# Patient Record
Sex: MALE | Race: WHITE | Employment: FULL TIME | URBAN - METROPOLITAN AREA
[De-identification: names, ages, dates, MRNs, and addresses within clinical notes are randomized per-mention and may not be internally consistent; named-entity substitution may affect disease eponyms.]

---

## 2020-01-23 RX ORDER — OMEPRAZOLE 20 MG/1
20 CAPSULE, DELAYED RELEASE ORAL DAILY
COMMUNITY

## 2020-01-23 RX ORDER — BUSPIRONE HYDROCHLORIDE 10 MG/1
10 TABLET ORAL 3 TIMES DAILY
COMMUNITY

## 2020-01-23 NOTE — PROGRESS NOTES
[address] [facility] time____0630________        Surgery time___0730_________    Take the following medications with a sip of water:    Do not eat or drink anything after 12:00 midnight prior to your surgery. EXCEPT PREP  This includes water chewing gum, mints and ice chips. You may brush your teeth and gargle the morning of your surgery, but do not swallow the water     You may be asked to stop blood thinners such as Coumadin, Plavix, Fragmin, Lovenox, etc., or any anti-inflammatories such as:  Aspirin, Ibuprofen, Advil, Naproxen prior to your surgery. We also ask that you stop any OTC medications such as fish oil, vitamin E, glucosamine, garlic, Multivitamins, COQ 10, etc.    We ask that you do not smoke 24 hours prior to surgery  We ask that you do not  drink any alcoholic beverages 24 hours prior to surgery     You must make arrangements for a responsible adult to take you home after your surgery. For your safety you will not be allowed to leave alone or drive yourself home. Your surgery will be cancelled if you do not have a ride home. Also for your safety, it is strongly suggested that someone stay with you the first 24 hours after your surgery. A parent or legal guardian must accompany a child scheduled for surgery and plan to stay at the hospital until the child is discharged. Please do not bring other children with you. For your comfort, please wear simple loose fitting clothing to the hospital.  Please do not bring valuables. Do not wear any make-up or nail polish on your fingers or toes      For your safety, please do not wear any jewelry or body piercing's on the day of surgery. All jewelry must be removed. If you have dentures, they will be removed before going to operating room. For your convenience, we will provide you with a container.     If you wear contact lenses or glasses, they will be removed, please bring a case for them.     If you have a living will and a durable power of  for healthcare, please bring in a copy. As part of our patient safety program to minimize surgical site infections, we ask you to do the following:    · Please notify your surgeon if you develop any illness between         now and the  day of your surgery. · This includes a cough, cold, fever, sore throat, nausea,         or vomiting, and diarrhea, etc.  ·  Please notify your surgeon if you experience dizziness, shortness         of breath or blurred vision between now and the time of your surgery. You may shower the night before surgery or the morning of   your surgery with an antibacterial soap. You will need to bring a photo ID and insurance card    [facility] has an onsite pharmacy, would you like to utilize our pharmacy     If you will be staying overnight and use a C-pap machine, please bring   your C-pap to hospital     Our goal is to provide you with excellent care, therefore, visitors will be limited to two(2) in the room at a time so that we may focus on providing this care for you. Please contact pre-admission testing if you have any further questions. [facility] phone number:  [phone]  Please note these are generalized instructions for all surgical cases, you may be provided with more specific instructions according to your surgery.

## 2020-01-31 ENCOUNTER — ANESTHESIA EVENT (OUTPATIENT)
Dept: ENDOSCOPY | Age: 55
End: 2020-01-31
Payer: COMMERCIAL

## 2020-02-03 ENCOUNTER — HOSPITAL ENCOUNTER (OUTPATIENT)
Age: 55
Setting detail: OUTPATIENT SURGERY
Discharge: HOME | End: 2020-02-03
Attending: INTERNAL MEDICINE | Admitting: INTERNAL MEDICINE
Payer: COMMERCIAL

## 2020-02-03 ENCOUNTER — ANESTHESIA (OUTPATIENT)
Dept: ENDOSCOPY | Age: 55
End: 2020-02-03
Payer: COMMERCIAL

## 2020-02-03 VITALS
SYSTOLIC BLOOD PRESSURE: 103 MMHG | DIASTOLIC BLOOD PRESSURE: 72 MMHG | OXYGEN SATURATION: 98 % | RESPIRATION RATE: 16 BRPM

## 2020-02-03 VITALS
OXYGEN SATURATION: 99 % | WEIGHT: 175 LBS | BODY MASS INDEX: 23.19 KG/M2 | HEIGHT: 73 IN | TEMPERATURE: 97.3 F | HEART RATE: 76 BPM | SYSTOLIC BLOOD PRESSURE: 117 MMHG | RESPIRATION RATE: 14 BRPM | DIASTOLIC BLOOD PRESSURE: 84 MMHG

## 2020-02-03 PROCEDURE — 7100000010 HC PHASE II RECOVERY - FIRST 15 MIN: Performed by: INTERNAL MEDICINE

## 2020-02-03 PROCEDURE — 88305 TISSUE EXAM BY PATHOLOGIST: CPT

## 2020-02-03 PROCEDURE — 7100000011 HC PHASE II RECOVERY - ADDTL 15 MIN: Performed by: INTERNAL MEDICINE

## 2020-02-03 PROCEDURE — 3700000000 HC ANESTHESIA ATTENDED CARE: Performed by: INTERNAL MEDICINE

## 2020-02-03 PROCEDURE — 2500000003 HC RX 250 WO HCPCS: Performed by: NURSE ANESTHETIST, CERTIFIED REGISTERED

## 2020-02-03 PROCEDURE — 3609010600 HC COLONOSCOPY POLYPECTOMY SNARE/COLD BIOPSY: Performed by: INTERNAL MEDICINE

## 2020-02-03 PROCEDURE — 2709999900 HC NON-CHARGEABLE SUPPLY: Performed by: INTERNAL MEDICINE

## 2020-02-03 PROCEDURE — 6360000002 HC RX W HCPCS: Performed by: NURSE ANESTHETIST, CERTIFIED REGISTERED

## 2020-02-03 PROCEDURE — 3700000001 HC ADD 15 MINUTES (ANESTHESIA): Performed by: INTERNAL MEDICINE

## 2020-02-03 PROCEDURE — 2580000003 HC RX 258: Performed by: ANESTHESIOLOGY

## 2020-02-03 RX ORDER — SODIUM CHLORIDE 9 MG/ML
INJECTION, SOLUTION INTRAVENOUS CONTINUOUS
Status: DISCONTINUED | OUTPATIENT
Start: 2020-02-03 | End: 2020-02-03 | Stop reason: HOSPADM

## 2020-02-03 RX ORDER — LIDOCAINE HYDROCHLORIDE 20 MG/ML
INJECTION, SOLUTION EPIDURAL; INFILTRATION; INTRACAUDAL; PERINEURAL PRN
Status: DISCONTINUED | OUTPATIENT
Start: 2020-02-03 | End: 2020-02-03 | Stop reason: SDUPTHER

## 2020-02-03 RX ORDER — PROPOFOL 10 MG/ML
INJECTION, EMULSION INTRAVENOUS PRN
Status: DISCONTINUED | OUTPATIENT
Start: 2020-02-03 | End: 2020-02-03 | Stop reason: SDUPTHER

## 2020-02-03 RX ORDER — SODIUM CHLORIDE 0.9 % (FLUSH) 0.9 %
10 SYRINGE (ML) INJECTION EVERY 12 HOURS SCHEDULED
Status: DISCONTINUED | OUTPATIENT
Start: 2020-02-03 | End: 2020-02-03 | Stop reason: HOSPADM

## 2020-02-03 RX ORDER — SODIUM CHLORIDE 0.9 % (FLUSH) 0.9 %
10 SYRINGE (ML) INJECTION PRN
Status: DISCONTINUED | OUTPATIENT
Start: 2020-02-03 | End: 2020-02-03 | Stop reason: HOSPADM

## 2020-02-03 RX ADMIN — PROPOFOL 15 MG: 10 INJECTION, EMULSION INTRAVENOUS at 07:50

## 2020-02-03 RX ADMIN — LIDOCAINE HYDROCHLORIDE 40 MG: 20 INJECTION, SOLUTION EPIDURAL; INFILTRATION; INTRACAUDAL; PERINEURAL at 07:29

## 2020-02-03 RX ADMIN — PROPOFOL 15 MG: 10 INJECTION, EMULSION INTRAVENOUS at 07:55

## 2020-02-03 RX ADMIN — PROPOFOL 15 MG: 10 INJECTION, EMULSION INTRAVENOUS at 07:43

## 2020-02-03 RX ADMIN — PROPOFOL 15 MG: 10 INJECTION, EMULSION INTRAVENOUS at 07:46

## 2020-02-03 RX ADMIN — LIDOCAINE HYDROCHLORIDE 10 MG: 20 INJECTION, SOLUTION EPIDURAL; INFILTRATION; INTRACAUDAL; PERINEURAL at 07:36

## 2020-02-03 RX ADMIN — PROPOFOL 15 MG: 10 INJECTION, EMULSION INTRAVENOUS at 07:53

## 2020-02-03 RX ADMIN — PROPOFOL 20 MG: 10 INJECTION, EMULSION INTRAVENOUS at 07:38

## 2020-02-03 RX ADMIN — LIDOCAINE HYDROCHLORIDE 10 MG: 20 INJECTION, SOLUTION EPIDURAL; INFILTRATION; INTRACAUDAL; PERINEURAL at 07:38

## 2020-02-03 RX ADMIN — LIDOCAINE HYDROCHLORIDE 20 MG: 20 INJECTION, SOLUTION EPIDURAL; INFILTRATION; INTRACAUDAL; PERINEURAL at 07:34

## 2020-02-03 RX ADMIN — LIDOCAINE HYDROCHLORIDE 20 MG: 20 INJECTION, SOLUTION EPIDURAL; INFILTRATION; INTRACAUDAL; PERINEURAL at 07:32

## 2020-02-03 RX ADMIN — PROPOFOL 40 MG: 10 INJECTION, EMULSION INTRAVENOUS at 07:32

## 2020-02-03 RX ADMIN — PROPOFOL 20 MG: 10 INJECTION, EMULSION INTRAVENOUS at 07:40

## 2020-02-03 RX ADMIN — PROPOFOL 80 MG: 10 INJECTION, EMULSION INTRAVENOUS at 07:29

## 2020-02-03 RX ADMIN — PROPOFOL 40 MG: 10 INJECTION, EMULSION INTRAVENOUS at 07:34

## 2020-02-03 RX ADMIN — SODIUM CHLORIDE: 0.9 INJECTION, SOLUTION INTRAVENOUS at 07:04

## 2020-02-03 RX ADMIN — PROPOFOL 20 MG: 10 INJECTION, EMULSION INTRAVENOUS at 07:36

## 2020-02-03 ASSESSMENT — PAIN SCALES - GENERAL
PAINLEVEL_OUTOF10: 0

## 2020-02-03 ASSESSMENT — LIFESTYLE VARIABLES: SMOKING_STATUS: 1

## 2020-02-03 ASSESSMENT — PAIN - FUNCTIONAL ASSESSMENT: PAIN_FUNCTIONAL_ASSESSMENT: 0-10

## 2020-02-03 NOTE — H&P
[facility]   Pre-operative History and Physical    Patient: Vaishali Bañuelos  : 1965  Acct#: [de-identified]    History Obtained From: electronic medical record    HISTORY OF PRESENT ILLNESS  Procedure:Colonoscopy  Indications:colon polyps  Past Medical History:        Diagnosis Date    Anxiety     GERD (gastroesophageal reflux disease)      Past Surgical History:        Procedure Laterality Date    COLONOSCOPY      HERNIA REPAIR      X 2     Medications prior to admission:   Prior to Admission medications    Medication Sig Start Date End Date Taking? Authorizing Provider   omeprazole (PRILOSEC) 20 MG delayed release capsule Take 20 mg by mouth daily   Yes Historical Provider, MD   busPIRone (BUSPAR) 10 MG tablet Take 10 mg by mouth 3 times daily   Yes Historical Provider, MD     Allergies:   Patient has no known allergies.     Social History     Socioeconomic History    Marital status:      Spouse name: Not on file    Number of children: Not on file    Years of education: Not on file    Highest education level: Not on file   Occupational History    Not on file   Social Needs    Financial resource strain: Not on file    Food insecurity:     Worry: Not on file     Inability: Not on file    Transportation needs:     Medical: Not on file     Non-medical: Not on file   Tobacco Use    Smoking status: Current Some Day Smoker     Types: Cigars    Smokeless tobacco: Never Used   Substance and Sexual Activity    Alcohol use: Not Currently    Drug use: Yes     Types: Marijuana     Comment: daily    Sexual activity: Not on file   Lifestyle    Physical activity:     Days per week: Not on file     Minutes per session: Not on file    Stress: Not on file   Relationships    Social connections:     Talks on phone: Not on file     Gets together: Not on file     Attends [employer] service: Not on file     Active member of club or organization: Not on file     Attends meetings of clubs or organizations: Not on file     Relationship status: Not on file    Intimate partner violence:     Fear of current or ex partner: Not on file     Emotionally abused: Not on file     Physically abused: Not on file     Forced sexual activity: Not on file   Other Topics Concern    Not on file   Social History Narrative    Not on file     Family History   Problem Relation Age of Onset    Cancer Mother     Cancer Father          PHYSICAL EXAM:      Ht 6' 1\" (1.854 m)   Wt 175 lb (79.4 kg)   BMI 23.09 kg/m²  I        Heart:normal    Lungs: normal    Abdomen: normal      ASA Grade:  See anesthesia note      ASSESSMENT AND PLAN:    1. Procedure options, risks and benefits reviewed with patient and expresses understanding.

## (undated) DEVICE — SNARE ENDOSCP L240CM LOOP W13MM DIA2.4MM SHT THROW SM OVL